# Patient Record
Sex: FEMALE | Race: WHITE | NOT HISPANIC OR LATINO | URBAN - METROPOLITAN AREA
[De-identification: names, ages, dates, MRNs, and addresses within clinical notes are randomized per-mention and may not be internally consistent; named-entity substitution may affect disease eponyms.]

---

## 2017-09-17 ENCOUNTER — OFFICE VISIT - RIVER FALLS (OUTPATIENT)
Dept: FAMILY MEDICINE | Facility: CLINIC | Age: 57
End: 2017-09-17

## 2017-09-28 ENCOUNTER — OFFICE VISIT - RIVER FALLS (OUTPATIENT)
Dept: FAMILY MEDICINE | Facility: CLINIC | Age: 57
End: 2017-09-28

## 2017-11-29 ENCOUNTER — OFFICE VISIT - RIVER FALLS (OUTPATIENT)
Dept: FAMILY MEDICINE | Facility: CLINIC | Age: 57
End: 2017-11-29

## 2018-01-31 ENCOUNTER — OFFICE VISIT - RIVER FALLS (OUTPATIENT)
Dept: FAMILY MEDICINE | Facility: CLINIC | Age: 58
End: 2018-01-31

## 2018-05-23 ENCOUNTER — OFFICE VISIT - RIVER FALLS (OUTPATIENT)
Dept: FAMILY MEDICINE | Facility: CLINIC | Age: 58
End: 2018-05-23

## 2018-07-31 ENCOUNTER — OFFICE VISIT - RIVER FALLS (OUTPATIENT)
Dept: FAMILY MEDICINE | Facility: CLINIC | Age: 58
End: 2018-07-31

## 2018-09-26 ENCOUNTER — OFFICE VISIT - RIVER FALLS (OUTPATIENT)
Dept: FAMILY MEDICINE | Facility: CLINIC | Age: 58
End: 2018-09-26

## 2018-11-28 ENCOUNTER — OFFICE VISIT - RIVER FALLS (OUTPATIENT)
Dept: FAMILY MEDICINE | Facility: CLINIC | Age: 58
End: 2018-11-28

## 2019-01-31 ENCOUNTER — OFFICE VISIT - RIVER FALLS (OUTPATIENT)
Dept: FAMILY MEDICINE | Facility: CLINIC | Age: 59
End: 2019-01-31

## 2019-03-28 ENCOUNTER — OFFICE VISIT - RIVER FALLS (OUTPATIENT)
Dept: FAMILY MEDICINE | Facility: CLINIC | Age: 59
End: 2019-03-28

## 2019-05-22 ENCOUNTER — OFFICE VISIT - RIVER FALLS (OUTPATIENT)
Dept: FAMILY MEDICINE | Facility: CLINIC | Age: 59
End: 2019-05-22

## 2019-07-31 ENCOUNTER — OFFICE VISIT - RIVER FALLS (OUTPATIENT)
Dept: FAMILY MEDICINE | Facility: CLINIC | Age: 59
End: 2019-07-31

## 2019-09-10 ENCOUNTER — COMMUNICATION - RIVER FALLS (OUTPATIENT)
Dept: FAMILY MEDICINE | Facility: CLINIC | Age: 59
End: 2019-09-10

## 2019-09-30 ENCOUNTER — OFFICE VISIT - RIVER FALLS (OUTPATIENT)
Dept: FAMILY MEDICINE | Facility: CLINIC | Age: 59
End: 2019-09-30

## 2019-11-26 ENCOUNTER — OFFICE VISIT - RIVER FALLS (OUTPATIENT)
Dept: FAMILY MEDICINE | Facility: CLINIC | Age: 59
End: 2019-11-26

## 2020-01-29 ENCOUNTER — OFFICE VISIT - RIVER FALLS (OUTPATIENT)
Dept: FAMILY MEDICINE | Facility: CLINIC | Age: 60
End: 2020-01-29

## 2020-02-06 ENCOUNTER — COMMUNICATION - RIVER FALLS (OUTPATIENT)
Dept: FAMILY MEDICINE | Facility: CLINIC | Age: 60
End: 2020-02-06

## 2020-03-31 ENCOUNTER — OFFICE VISIT - RIVER FALLS (OUTPATIENT)
Dept: FAMILY MEDICINE | Facility: CLINIC | Age: 60
End: 2020-03-31

## 2020-05-27 ENCOUNTER — OFFICE VISIT - RIVER FALLS (OUTPATIENT)
Dept: FAMILY MEDICINE | Facility: CLINIC | Age: 60
End: 2020-05-27

## 2020-07-07 ENCOUNTER — COMMUNICATION - RIVER FALLS (OUTPATIENT)
Dept: FAMILY MEDICINE | Facility: CLINIC | Age: 60
End: 2020-07-07

## 2020-08-26 ENCOUNTER — OFFICE VISIT - RIVER FALLS (OUTPATIENT)
Dept: FAMILY MEDICINE | Facility: CLINIC | Age: 60
End: 2020-08-26

## 2020-10-15 ENCOUNTER — AMBULATORY - RIVER FALLS (OUTPATIENT)
Dept: FAMILY MEDICINE | Facility: CLINIC | Age: 60
End: 2020-10-15

## 2020-10-28 ENCOUNTER — OFFICE VISIT - RIVER FALLS (OUTPATIENT)
Dept: FAMILY MEDICINE | Facility: CLINIC | Age: 60
End: 2020-10-28

## 2020-12-09 ENCOUNTER — COMMUNICATION - RIVER FALLS (OUTPATIENT)
Dept: FAMILY MEDICINE | Facility: CLINIC | Age: 60
End: 2020-12-09

## 2020-12-17 ENCOUNTER — OFFICE VISIT - RIVER FALLS (OUTPATIENT)
Dept: FAMILY MEDICINE | Facility: CLINIC | Age: 60
End: 2020-12-17

## 2021-02-26 ENCOUNTER — OFFICE VISIT - RIVER FALLS (OUTPATIENT)
Dept: FAMILY MEDICINE | Facility: CLINIC | Age: 61
End: 2021-02-26

## 2022-02-15 NOTE — PROGRESS NOTES
MARIAM CLEARY : 1960 MRN: [id number]  [facility] Virtual Visit  S: The resident s primary medical problems include hypothyroidism, seizure disorder and cognitive disability.  Nursing staff reports she is stable and they have no new concerns.  She is an unreliable historian.    O: Nursing staff reports vital signs to be within acceptable limits.  She appears alert and comfortable.    A: 1.  Seizure disorder, controlled.    2.  Hypothyroidism, controlled.    3.  Cognitive disability status quo.   P: Continue same treatment plan.    D:  2021  T:  2021 Abdias Barajas MD/sq    c:  [facility]

## 2022-02-15 NOTE — PROGRESS NOTES
EVINMARIAMCarlee :   1960 MRN:   [id number]  [facility] Visit  S: The resident s labs are reviewed and all within acceptable limits.  Nursing staff has no other concerns at the present time, other than a 5 pound weight loss in the last month.    O: Blood pressure is 120/57.  Pulse is 99.  Weight is 124.  She appears alert and comfortable.  Skin is normal color, temperature, and non-diaphoretic.  Lungs are clear bilaterally.  Heart is regular rate and rhythm without murmur.  No peripheral cyanosis or edema.    A: 1.  Cognitive impairment.     2.  Significant physical disability secondary to cerebral palsy.     3.  Hypothyroidism, controlled.     4.  Seizure disorder, controlled.    P: Continue same orders.    Follow up in 60 days.    D:  2018  T:  2018 Abdias Barajas MD/sq    c:  [facility]

## 2022-02-15 NOTE — TELEPHONE ENCOUNTER
---------------------  From: Lucita Michele CMA (eRx Pool ([address]_[place]))   To: Abdias Barajas MD;     Sent: 1/15/2019 3:35:03 PM CST  Subject: FW: Medication Management   Due Date/Time: 1/16/2019 2:38:00 PM CST             ** Patient matched by Lucita Michele CMA on 1/15/2019 3:34:57 PM CST **      ------------------------------------------  From: [facility]  To: Abdias Barajas MD  Sent: January 15, 2019 2:38:31 PM CST  Subject: Medication Management  Due: January 16, 2019 2:38:31 PM CST    ** On Hold Pending Signature **  Drug: traMADol (traMADol 50 mg oral tablet)  Take half (1/2) tablet by mouth twice daily (AM & HS)  Quantity: 45 EA       Days Supply: 0         Refills: 0  Substitutions Allowed  Notes from Pharmacy:     Dispensed Drug: traMADol (traMADol 50 mg oral tablet)  Take half (1/2) tablet by mouth twice daily (AM & HS)  Quantity: 45 EA       Days Supply: 0         Refills: 0  Substitutions Allowed  Notes from Pharmacy:   ------------------------------------------

## 2022-02-15 NOTE — TELEPHONE ENCOUNTER
---------------------  From: Lucita Michele CMA   To: Abdias Barajas MD;     Sent: 5/26/2020 10:25:03 AM CDT  Subject: General Message     Message left via voicemail that last Wed late afternoon pt fell in shower and wanted [facility] to know in case there were any further instructions.... per nurse pt was doing ok

## 2022-02-15 NOTE — PROGRESS NOTES
RAHULMARIAM VELASCOCarlee : 1960 MRN: [id number]  [facility] Visit  S: Primary diagnoses: Seizure disorder.    The resident is nonverbal.  Nursing staff has no concerns except for a note that the resident fell off a shower chair this past week but there did not seem to be any injury.    O: Blood pressure is 104/63.  Pulse 67.  Weight 151.     She appears comfortable.  Skin is normal color, temperature, and non-diaphoretic.  Lungs are clear bilaterally.  Heart is regular rate and rhythm without murmur.  No peripheral cyanosis or edema.  A: 1.  Cognitive impairment.    2.  Cerebral palsy.    3.  Seizure disorder.   P: Orders were written the same.  Follow up in 60 days.   D:  2020  T:  2020 Abdias Barajas MD/sq    c:  [facility]

## 2022-02-15 NOTE — PROGRESS NOTES
EVINMARIAMCarlee :   1960 MRN:   [id number]  [facility] Visit  S: The nursing staff has no concerns.    O: Blood pressure is 104/68.  Pulse is 73.  Weight is 132.    She is alert and comfortable.  Skin is normal color, temperature, and non-diaphoretic.  Lungs are clear bilaterally.  Normal respiratory rate and effort.  Heart is regular rate and rhythm without murmur, S3 or S4.  No jugular venous distension or carotid bruits.  No peripheral cyanosis or edema.  A: 1.  Cerebral palsy.    2.  Seizure disorder.    3.  Cognitive impairment.    4.  Hypothyroidism.   P: Continue same treatment plan.  Follow up in 60 days.   D:  2019  T:  2019 Abdias Barajas MD/sq    c:  [facility]

## 2022-02-15 NOTE — TELEPHONE ENCOUNTER
---------------------  From: Lucita Michele CMA (eRx Pool ([address]224_[facility]))   To: Abdias Barajas MD;     Sent: 12/9/2020 7:54:50 AM CST  Subject: FW: Medication Management   Due Date/Time: 12/9/2020 4:17:00 PM CST           ------------------------------------------  From: [facility]  To: Abdias Barajas MD  Sent: December 8, 2020 4:17:21 PM CST  Subject: Medication Management  Due: December 8, 2020 10:07:04 AM CST     ** On Hold Pending Signature **     Drug: traMADol (traMADol 50 mg oral tablet), TAKE HALF (1/2) TABLET BY MOUTH TWICE DAILY (AM & HS)  Quantity: 30 tab(s)  Days Supply: 30  Refills: 4  Substitutions Allowed  Notes from Pharmacy:     Dispensed Drug: traMADol (traMADol 50 mg oral tablet), TAKE HALF (1/2) TABLET BY MOUTH TWICE DAILY (AM & HS)  Quantity: 30 tab(s)  Days Supply: 30  Refills: 4  Substitutions Allowed  Notes from Pharmacy: * * N O T I C E * * PRESCRIPTION PREVIOUSLY AUTHORIZED BY DOCTOR:ABDIAS BARAJAS [phone]* * *  ------------------------------------------

## 2022-02-15 NOTE — TELEPHONE ENCOUNTER
Entered by Lucita Michele CMA on July 08, 2020 11:48:39 AM CDT  ---------------------  From: Lucita Michele CMA   To: [facility]    Sent: 7/8/2020 11:48:39 AM CDT  Subject: Medication Management     ** Not Approved: Refill not appropriate, filled and sent by provider **  traMADol (TRAMADOL HCL 50MG)  TAKE HALF (1/2) TABLET BY MOUTH TWICE DAILY (AM & HS)  Qty:  30 tab(s)        Days Supply:  30        Refills:  4          Substitutions Allowed     Route To Pharmacy - [facility]   Signed by Lucita Michele CMA              ** Patient matched by Lucita Michele CMA on 7/7/2020 2:05:22 PM CDT **      ------------------------------------------  From: [facility]  To: Abdias Barajas MD  Sent: July 7, 2020 1:34:37 PM CDT  Subject: Medication Management  Due: June 24, 2020 10:20:04 PM CDT     ** On Hold Pending Signature **     Drug: traMADol (traMADol 50 mg oral tablet), TAKE HALF (1/2) TABLET BY MOUTH TWICE DAILY (AM & HS)  Quantity: 30 tab(s)  Days Supply: 30  Refills: 4  Substitutions Allowed  Notes from Pharmacy:     Dispensed Drug: traMADol (traMADol 50 mg oral tablet), TAKE HALF (1/2) TABLET BY MOUTH TWICE DAILY (AM & HS)  Quantity: 30 tab(s)  Days Supply: 30  Refills: 4  Substitutions Allowed  Notes from Pharmacy:  ------------------------------------------

## 2022-02-15 NOTE — TELEPHONE ENCOUNTER
---------------------  From: Lucita Michele CMA (eRx Pool ([address]_[place]))   To: Abdias Barajas MD;     Sent: 1/31/2019 3:43:54 PM CST  Subject: FW: Medication Management   Due Date/Time: 2/1/2019 3:42:00 PM CST             ** Patient matched by Lucita Michele CMA on 1/31/2019 3:43:45 PM CST **      ------------------------------------------  From: [facility]  To: Abdias Barajas MD  Sent: January 31, 2019 3:42:52 PM CST  Subject: Medication Management  Due: February 1, 2019 3:42:52 PM CST    ** On Hold Pending Signature **  Drug: traMADol (traMADol 50 mg oral tablet)  Take half (1/2) tablet by mouth twice daily (AM & HS)  Quantity: 45 EA       Days Supply: 0         Refills: 0  Substitutions Allowed  Notes from Pharmacy:     Dispensed Drug: traMADol (traMADol 50 mg oral tablet)  Take half (1/2) tablet by mouth twice daily (AM & HS)  Quantity: 45 EA       Days Supply: 0         Refills: 0  Substitutions Allowed  Notes from Pharmacy:   ------------------------------------------

## 2022-02-15 NOTE — TELEPHONE ENCOUNTER
---------------------  From: Lucita Michele CMA (eRx Pool ([address]_[place] [facility]))   To: Abdias Barajas MD;     Sent: 2/21/2019 10:12:20 AM CST  Subject: FW: Medication Management   Due Date/Time: 2/22/2019 9:44:00 AM CST             ** Patient matched by Lucita Michele CMA on 2/21/2019 10:12:13 AM CST **      ------------------------------------------  From: [facility]  To: Abdias Barajas MD  Sent: February 21, 2019 9:44:52 AM CST  Subject: Medication Management  Due: February 22, 2019 9:44:52 AM CST    ** On Hold Pending Signature **  Drug: traMADol (traMADol 50 mg oral tablet)  Take half (1/2) tablet by mouth twice daily (AM & HS)  Quantity: 45 EA       Days Supply: 0         Refills: 0  Substitutions Allowed  Notes from Pharmacy:     Dispensed Drug: traMADol (traMADol 50 mg oral tablet)  Take half (1/2) tablet by mouth twice daily (AM & HS)  Quantity: 45 EA       Days Supply: 0         Refills: 0  Substitutions Allowed  Notes from Pharmacy:   ---------------------------------------------------------------  From: Abdias Barajas MD   To: [facility]    Sent: 2/21/2019 12:11:03 PM CST  Subject: FW: Medication Management     ** Not Approved: Request responded to by other means **  traMADol (TraMADol HCl Tablet 50 MG)  Take half (1/2) tablet by mouth twice daily (AM & HS)  Qty:  45 EA        Days Supply:  0        Refills:  0          Substitutions Allowed     Route To Pharmacy - [facility]

## 2022-02-15 NOTE — TELEPHONE ENCOUNTER
---------------------  From: Lucita Michele CMA (eRx Pool ([address]_[place] [facility]))   To: Abdias Barajas MD;     Sent: 7/7/2020 2:05:32 PM CDT  Subject: FW: Medication Management   Due Date/Time: 7/8/2020 1:34:00 PM CDT             ** Patient matched by Lucita Michele CMA on 7/7/2020 2:05:22 PM CDT **      ------------------------------------------  From: [facility]  To: Abdias Barajas MD  Sent: July 7, 2020 1:34:37 PM CDT  Subject: Medication Management  Due: June 24, 2020 10:20:04 PM CDT     ** On Hold Pending Signature **     Drug: traMADol (traMADol 50 mg oral tablet), TAKE HALF (1/2) TABLET BY MOUTH TWICE DAILY (AM & HS)  Quantity: 30 tab(s)  Days Supply: 30  Refills: 4  Substitutions Allowed  Notes from Pharmacy:     Dispensed Drug: traMADol (traMADol 50 mg oral tablet), TAKE HALF (1/2) TABLET BY MOUTH TWICE DAILY (AM & HS)  Quantity: 30 tab(s)  Days Supply: 30  Refills: 4  Substitutions Allowed  Notes from Pharmacy:  ------------------------------------------  ** Submitted: **  Order:traMADol (traMADol 50 mg oral tablet)  See Instructions  TAKE HALF (1/2) TABLET BY MOUTH TWICE DAILY (AM & HS)  Qty:  30 tab(s)        Refills:  5          Route To Pharmacy - [facility]    Signed by Abdias Barajas MD  7/8/2020 2:42:00 PM [facility]

## 2022-02-15 NOTE — PROGRESS NOTES
MARIAM CLEARY :   1960 MRN:   [id number]  [facility] Visit  S: Nursing staff have no concerns.    O: Blood pressure is 95/63.  Pulse is 72.  Weight is 142.  She is alert and comfortable.  She is nonverbal.  Lungs are clear.  Heart is regular rate and rhythm without murmur.  No peripheral cyanosis or edema.  A: 1.  Cognitive impairment.    2.  Seizure disorder.   P: Continue same treatment plan.  Follow up in 60 days.   D:  2019  T:  2019 Abdias Barajas MD/sq    c:  [facility]

## 2022-02-15 NOTE — TELEPHONE ENCOUNTER
---------------------  From: Lucita Michele CMA (eRx Pool ([address]_[place]))   To: Abdias Barajas MD;     Sent: 1/8/2019 7:44:04 AM CST  Subject: FW: Medication Management   Due Date/Time: 1/8/2019 5:10:00 PM CST             ** Patient matched by Lucita Michele CMA on 1/8/2019 7:43:57 AM CST **      ------------------------------------------  From: [facility]  To: Abdias Barajas MD  Sent: January 7, 2019 5:10:00 PM CST  Subject: Medication Management  Due: January 8, 2019 5:10:00 PM CST    ** On Hold Pending Signature **  Drug: traMADol (traMADol 50 mg oral tablet)  Take half (1/2) tablet by mouth twice daily (AM & HS)  Quantity: 45 EA       Days Supply: 0         Refills: 0  Substitutions Allowed  Notes from Pharmacy:     Dispensed Drug: traMADol (traMADol 50 mg oral tablet)  Take half (1/2) tablet by mouth twice daily (AM & HS)  Quantity: 45 EA       Days Supply: 0         Refills: 0  Substitutions Allowed  Notes from Pharmacy:   ------------------------------------------

## 2022-02-15 NOTE — PROGRESS NOTES
MARIAM CLEARY  : 1960  MRN: [id number]  [facility] Visit  S: The patient is cognitively disabled.  She is unable to verbally communicate.  Nursing staff has no concerns.  O: On examination, blood pressure is 125/68.  Pulse is 79.  The patient appears alert and comfortable.  Skin is normal color, temperature, and non-diaphoretic.  Lungs are clear bilaterally.  Heart is regular rate and rhythm without murmur.  No peripheral cyanosis or edema.   A: Cognitive impairment.  P: Continue same treatment plan.  Follow up in 60 days.   D: 2019  T: 2019  Abdias Barajas MD/afua  c: [facility]

## 2022-02-15 NOTE — PROGRESS NOTES
RAHULMARIAM VELASCOCarlee  :  1960  MRN:  [id number]  [facility] VISIT   S: The patient is cognitively impaired.  She has spastic cerebral palsy.  Nursing staff reports stable medical condition since the last visit.  The patient is no longer ambulatory.     O: Blood pressure is 127/64.  Pulse is 67.  Weight is 134.     The patient appears alert and comfortable.     Skin is normal in color and temperature; non-diaphoretic.    Lungs are clear bilaterally.    Heart has a regular rate and rhythm without murmur.    No peripheral cyanosis or edema    A: 1) Cognitive impairment.   2) Hypothyroidism.    3) Osteoarthritis.   4) Cerebral palsy.   P: Continue same overall treatment plan.  Follow up again in sixty days.     D:  2019  T:  2019 Abdias Barajas MD/larry    c:  [facility]

## 2022-02-15 NOTE — PROGRESS NOTES
MARIAM CLEARY   : 1960   MRN: [id number]  [facility] Visit  S: The nursing staff notes that she has increased vocalizations.  This is interpreted as increased pain.  She has been on Tylenol 650 mg t.i.d. and this is not controlling her discomfort.    O: Blood pressure is 98/65.  Pulse is 83.  Weight is 131 pounds.  Lungs are clear.  Heart is regular rate and rhythm without murmur.  No peripheral cyanosis or edema.   A: 1. Cognitive impairment.   2. Seizure disorder.   3. Increased discomfort likely due to osteoarthritis.  P: We will initiate an order for tramadol 50 mg t.i.d. and monitor for improvement in her pain level.  Follow up in 60 days.   D: 2018  T: 2018   Abdias Barajas MD/afua  c: [facility]

## 2022-02-15 NOTE — PROGRESS NOTES
EVIN MARIAM FAMCarlee : 1960 MRN: [id number]  [facility] Visit  S: The resident has primary diagnoses of congenital encephalopathy, hypothyroidism, Rett syndrome, epilepsy, contracture of multiple joints, dysphagia, and urinary incontinence.  She is unable to provide any history.  Nursing staff has no concerns.    O: On exam blood pressure is 91/57.  Pulse is 70.  Weight is 162.  She appears alert and comfortable.  She does appear to recognize me.  Skin is normal color, temperature, and non-diaphoretic.  Lungs are clear bilaterally.  Heart is regular rate and rhythm without murmur.  No peripheral cyanosis or edema.  A: Primary diagnoses as outlined above.    P: Continue same treatment plan.  Follow up in 60 days.   D:  2020  T:  2020 Abdias Barajas MD/sq    c:  [facility]

## 2022-02-15 NOTE — PROGRESS NOTES
EVINMARIAMCarlee : 1960 MRN: [id number]  [facility] Visit  S: The nursing staff reports no problems this past 60 days.  It is noted that her mother recently passed away; unclear whether the resident realizes this or not.  She is no longer ambulatory, she is up in her wheelchair three times a day, otherwise is in bed.    O: On exam she is alert and appears comfortable.  Normal respiratory rate and effort.  No peripheral cyanosis or edema.  A: 1.  Cerebral palsy.   2.  Seizure disorder.    3.  Cognitive impairment, all stable.   P: No change in orders.  Follow up in 60 days.   D:  2020  T:  2020 Abdias Barajas MD/sq    c:  [facility]

## 2022-02-15 NOTE — PROGRESS NOTES
EVINMARIAMCarlee :   1960 MRN:   [id number]  [facility] Visit  S: The nursing staff has no concerns.  Her problem list includes profound mental retardation, hypothyroidism, swallowing disorder, constipation, seizure disorder, exotropia, osteopenia, osteoarthritis, and dysphagia.   O: Pulse is 82.  Weight is 138.     She is comfortable.  Skin is normal color, temperature, and non-diaphoretic.  Lungs are clear bilaterally.  Heart is regular rate and rhythm without murmur.  No peripheral cyanosis or edema.  A: Problem list as above, no acute changes.   P: Continue same treatment plan.  Follow up in 60 days.   D:  2019  T:  2019 Abdias Barajas MD/sq    c:  [facility]

## 2022-02-15 NOTE — TELEPHONE ENCOUNTER
---------------------  From: Lucita Michele CMA (eRx Pool ([address]_[place] [facility]))   To: Abdias Barajas MD;     Sent: 2/6/2020 2:33:57 PM CST  Subject: FW: Medication Management   Due Date/Time: 2/7/2020 1:25:00 PM CST             ** Patient matched by Lucita Michele CMA on 2/6/2020 2:33:47 PM CST **      ------------------------------------------  From: [facility]  To: Abdias Barajas MD  Sent: February 6, 2020 1:25:29 PM CST  Subject: Medication Management  Due: February 7, 2020 1:25:29 PM CST    ** On Hold Pending Signature **  Drug: traMADol (traMADol 50 mg oral tablet)  TAKE HALF (1/2) TABLET BY MOUTH TWICE DAILY (AM & HS)  Quantity: 30 tab(s)  Days Supply: 30  Refills: 4  Substitutions Allowed  Notes from Pharmacy:     Dispensed Drug: traMADol (traMADol 50 mg oral tablet)  TAKE HALF (1/2) TABLET BY MOUTH TWICE DAILY (AM & HS)  Quantity: 30 tab(s)  Days Supply: 30  Refills: 4  Substitutions Allowed  Notes from Pharmacy:   ---------------------------------------------------------------  From: Abdias Barajas MD   To: [facility]    Sent: 2/6/2020 3:05:23 PM CST  Subject: FW: Medication Management     ** Submitted: **  Complete:traMADol (traMADol 50 mg oral tablet)   Signed by Abdias Barajas MD  2/6/2020 3:05:00 PM    ** Approved **  traMADol (TRAMADOL HCL 50MG)  TAKE HALF (1/2) TABLET BY MOUTH TWICE DAILY (AM & HS)  Qty:  30 tab(s)        Days Supply:  30        Refills:  4          Substitutions Allowed     Route To Pharmacy - [facility]

## 2022-02-15 NOTE — PROGRESS NOTES
RAHULMARIAM VELASCOCarlee : 1960 MRN: [id number]  [facility] Visit  S: The resident is nonverbal.  Nursing staff does not note any recent medical problems.    O: On exam blood pressure is 95/63.  Pulse is 72.  Weight is 144.     In general she is nonverbal and lies curled up on her bed.  She does respond to tactile and verbal stimuli.  Skin is normal color, temperature, and non-diaphoretic.  Lungs are clear.  Heart is regular rate and rhythm without murmur.  No peripheral cyanosis or edema.  A: Primary diagnoses include     1. Cognitive disorder.   2. Seizure disorder.   P: Continue same treatment plan.  Follow up in 60 days.   D:  2020  T:  2020 Abdias Barajas MD/sq    c:  [facility]

## 2022-02-15 NOTE — PROGRESS NOTES
MARIAM CLEARY :  1960 MRN:  [id number]  [facility] VISIT  Primary Health Care Provider:  Abdias Barajas MD  S: The patient does not provide any subjective information.     CURRENT DIAGNOSES:  Hypothyroidism, cognitive impairment,    Rett syndrome, epilepsy, urinary and stool incontinence, nonambulatory,   mute.  O: VITAL SIGNS:  Reviewed.  Within acceptable limits.   LUNGS:  Clear.   HEART:  Regular rate and rhythm without murmur.   ABDOMEN:  Soft.  Nontender.     EXTREMITIES:  No peripheral cyanosis or edema.  A: 1.  Hypothyroidism, controlled.   2.  Epilepsy, controlled.   3.  Cognitive impairment, status quo.  P: Continue with the same treatment plan.  Follow-up in 60 days.  D:  2018  T:  2018     Abdias Barajas MD/sak  Copy to [facility]

## 2022-02-15 NOTE — PROGRESS NOTES
EVIN MARIAM CRISTELCarlee : 1960  10/28/2020 MRN: [id number]  [facility] Visit  S: The resident is noncommunicative.  Nursing staff has no concerns.   O: Blood pressure is 108/82.  Pulse is 77.  Weight is 162.  She appears alert and comfortable.  Skin is normal color, temperature, and non-diaphoretic.  Lungs are clear bilaterally.  Heart is regular rate and rhythm without murmur. No peripheral cyanosis or edema.  A: 1.  Cognitive impairment.    2.  Seizure disorder.    P: Continue same treatment plan.  Follow up in 60 days.   D:  10/28/2020  T:  10/29/2020 Abdias Barajas MD/sq    c:  [facility]

## 2022-02-15 NOTE — PROGRESS NOTES
EVINMARIAM. : 1960 MRN: [id number]  [facility] Visit  S: The resident is nonverbal and very minimally communicative.  Nursing staff does not report any problems.  She does need an order for sedation prior to dental procedures and dental procedures are planned in the near future.  I have ordered lorazepam 1 mg to be given one hour prior to dental procedure.   O: On exam pulse is 70.  Weight is 169.  She appears alert and comfortable.  Skin is normal color, temperature, and non-diaphoretic.  Normal respiratory rate and effort.  She is not otherwise examined due to COVID restrictions.  A: 1.  Seizure disorder, controlled.    2.  Cognitive impairment, stable.     3.  Hypothyroidism, stable.   P: Continue same treatment plan.  Follow up in 60 days.   D:  2020  T:  2020 Abdias Barajas MD/aakash    c:  [facility]   f/u with pcp

## 2022-02-15 NOTE — PROGRESS NOTES
ROLANMARLEENMARIAM VELASCOCarlee  :  1960  MRN:  [id number]  [facility] VISIT  S: The patient continues to have weight loss.  Her health care decision-maker is aware of this.  She is basically on comfort measures but not yet hospice.      O: Blood pressure is 133/71.  Pulse is 79.  Weight is 120, down 14 pounds in the last year.  The patient is alert and comfortable.  Skin is normal in color and temperature; non-diaphoretic.  Lungs are clear bilaterally; normal respiratory rate and effort.  Heart has a regular rate and rhythm without murmur, S3 or S4.  No jugular venous distension or carotid bruits.  No peripheral cyanosis or edema.   A: 1) Cognitive impairment.   2) Seizure disorder.      3) Continuing weight loss without explanation.    P: Continue same per health decision-maker.     D:  2017  T:  10/02/2017 Abdias Barajas MD/larry    c:  [facility]

## 2022-02-15 NOTE — TELEPHONE ENCOUNTER
---------------------  From: Lucita Michele CMA ([facility] ([id number] [facility]))   To: Abdias Barajas MD;     Sent: 9/10/2019 3:43:41 PM CDT  Subject: FW: Medication Management   Due Date/Time: 9/11/2019 3:15:00 PM CDT             ** Patient matched by Lucita Michele CMA on 9/10/2019 3:43:29 PM CDT **      ------------------------------------------  From: [facility]  To: Abdias Barajas MD  Sent: September 10, 2019 3:15:25 PM CDT  Subject: Medication Management  Due: September 11, 2019 3:15:25 PM CDT    ** On Hold Pending Signature **  Drug: traMADol (traMADol 50 mg oral tablet)  TAKE HALF (1/2) TABLET BY MOUTH TWICE DAILY (AM & HS)  Quantity: 30 tab(s)     Days Supply: 29        Refills: 5  Substitutions Allowed  Notes from Pharmacy: Generic For:ULTRAM 50MG    Dispensed Drug: traMADol (traMADol 50 mg oral tablet)  TAKE HALF (1/2) TABLET BY MOUTH TWICE DAILY (AM & HS)  Quantity: 30 tab(s)     Days Supply: 29        Refills: 5  Substitutions Allowed  Notes from Pharmacy: Generic For:ULTRAM 50MG  ---------------------------------------------------------------  From: Abdias Barajas MD   To: [facility]    Sent: 9/11/2019 9:33:57 AM CDT  Subject: FW: Medication Management     ** Submitted: **  Complete:traMADol (traMADol 50 mg oral tablet)   Signed by Abdias Barajas MD  9/11/2019 9:33:00 AM    ** Approved **  traMADol (TRAMADOL HCL 50MG)  TAKE HALF (1/2) TABLET BY MOUTH TWICE DAILY (AM & HS)  Qty:  30 tab(s)        Days Supply:  29        Refills:  5          Substitutions Allowed     Route To Pharmacy - [facility]   Note from Pharmacy:  Generic For:ULTRAM 50MG

## 2022-02-15 NOTE — PROGRESS NOTES
EVINMARIAMCarlee : 1960 MRN: [id number]  [facility] Visit  S: The nursing staff has no concerns about the resident.    O: Blood pressure is 96/60.  Pulse is 75.  Weight is 132.  She is alert and comfortable.  Skin is normal color, temperature, and non-diaphoretic.  Lungs are clear bilaterally.  Normal respiratory rate and effort.  Heart is regular rate and rhythm without murmur, S3 or S4.  No jugular venous distension or carotid bruits.  No peripheral cyanosis or edema.  A: 1.  Cognitive impairment.    2.  Seizure disorder.     3.  Nonverbal.   P: Continue same treatment plan.  Follow up in 60 days.   D:  2018  T:  2018 Abdias Barajas MD/sq    c:  [facility]

## 2022-02-15 NOTE — PROGRESS NOTES
EVINMARIAMCarlee :   1960 MRN:   [id number]  [facility] Visit  S: The resident is at the care center long term.  She has cognitive impairment and cerebral palsy.  Nursing staff have no concerns.    O: Blood pressure is 108/63.  Pulse is 68.  Weight is 132.  She is alert and comfortable.  Skin is normal color, temperature, and non-diaphoretic.  Lungs are clear bilaterally.  Normal respiratory rate and effort.  Heart is regular rate and rhythm without murmur, S3 or S4.  No jugular venous distension or carotid bruits.  No peripheral cyanosis or edema.  A: Medical condition is stable.   P: No change in orders.  Follow up in 60 days.    D:  2018  T:  2018 Abdias Barajas MD/sq    c:  [facility]

## 2022-02-15 NOTE — PROGRESS NOTES
EVINMARIAMCarlee :   1960 MRN:   [id number]  [facility] Visit  S: Nursing staff have no concerns.    O: Blood pressure 114/72.  Pulse is 66.  Weight is 123 and stable.  She is alert and comfortable.  Skin is normal color, temperature, and non-diaphoretic.  Lungs are clear bilaterally.  Normal respiratory rate and effort.  Heart is regular rate and rhythm without murmur, S3 or S4.  No jugular venous distension or carotid bruits.  No peripheral cyanosis or edema.  A: 1.  Cognitive impairment.    2.  Mute.   P: Continue same treatment plan.  Follow up in 60 days.   D:  2017  T:  2017 Abdias Barajas MD/sq    c:  [facility]

## 2022-02-15 NOTE — PROGRESS NOTES
MARIAM CLEARY :   1960 MRN:   [id number]  [facility] Visit  S: The resident is nonverbal.  Nursing staff has no concerns.    O: Vital signs reviewed and within acceptable limits. Lungs are clear.  Heart is regular rate and rhythm without murmur.  No peripheral cyanosis or edema.  A: 1.  Cognitive disability.    2.  Seizure disorder.   P: Continue same treatment plan.  Follow up in 60 days.   D:  2019  T:  10/02/2019 Abdias Barajas MD/sq    c:  [facility]

## 2022-02-15 NOTE — PROGRESS NOTES
MARIAM CLEARY  : 1960  MRN: [id number]  [facility] Visit  S: Nursing staff has no concerns.  The patient s primary medical problems include congenital encephalopathy, hypothyroidism, Rett syndrome, epilepsy, muteness, osteoarthritis and contractures of multiple joints.  O: Blood pressure is 112/68.  Pulse is 72.  Weight is 129 pounds.  The patient is alert and comfortable.  Skin is normal color, temperature, and non-diaphoretic.  Lungs are clear bilaterally.  Normal respiratory rate and effort.  Heart is regular rate and rhythm without murmur, S3 or S4.  No jugular venous distension or carotid bruits.  No peripheral cyanosis or edema.  A: Above chronic medical problems are stable.  P: Continue current treatment plan.  Follow up in 60 days.   D: 2018  T: 2018  Abdias Baarjas MD/afua  c: [facility]

## 2022-02-15 NOTE — TELEPHONE ENCOUNTER
Entered by Lucita Michele CMA on December 09, 2020 8:56:37 AM CST  ---------------------  From: Lucita Michele CMA   To: [facility]    Sent: 12/9/2020 8:56:37 AM CST  Subject: Medication Management     ** Not Approved: Refill not appropriate, filled and sent separately by provider **  traMADol (TRAMADOL HCL 50MG)  TAKE HALF (1/2) TABLET BY MOUTH TWICE DAILY (AM & HS)  Qty:  30 tab(s)        Days Supply:  30        Refills:  4          Substitutions Allowed     Route To Pharmacy - [facility]   Note from Pharmacy:  * * N O T I C E * * PRESCRIPTION PREVIOUSLY AUTHORIZED BY DOCTOR:ABDIAS BARAJAS [phone]* * *  Signed by Lucita Michele CMA            ------------------------------------------  From: [facility]  To: Abdias Barajas MD  Sent: December 8, 2020 4:17:21 PM CST  Subject: Medication Management  Due: December 8, 2020 10:07:04 AM CST     ** On Hold Pending Signature **     Drug: traMADol (traMADol 50 mg oral tablet), TAKE HALF (1/2) TABLET BY MOUTH TWICE DAILY (AM & HS)  Quantity: 30 tab(s)  Days Supply: 30  Refills: 4  Substitutions Allowed  Notes from Pharmacy:     Dispensed Drug: traMADol (traMADol 50 mg oral tablet), TAKE HALF (1/2) TABLET BY MOUTH TWICE DAILY (AM & HS)  Quantity: 30 tab(s)  Days Supply: 30  Refills: 4  Substitutions Allowed  Notes from Pharmacy: * * N O T I C E * * PRESCRIPTION PREVIOUSLY AUTHORIZED BY DOCTOR:ABDIAS BARAJAS [phone]* * *  ------------------------------------------